# Patient Record
Sex: MALE | Race: WHITE | Employment: FULL TIME | URBAN - METROPOLITAN AREA
[De-identification: names, ages, dates, MRNs, and addresses within clinical notes are randomized per-mention and may not be internally consistent; named-entity substitution may affect disease eponyms.]

---

## 2019-02-02 ENCOUNTER — APPOINTMENT (OUTPATIENT)
Dept: GENERAL RADIOLOGY | Facility: CLINIC | Age: 39
End: 2019-02-02
Payer: COMMERCIAL

## 2019-02-02 ENCOUNTER — NURSE TRIAGE (OUTPATIENT)
Dept: NURSING | Facility: CLINIC | Age: 39
End: 2019-02-02

## 2019-02-02 ENCOUNTER — HOSPITAL ENCOUNTER (EMERGENCY)
Facility: CLINIC | Age: 39
Discharge: HOME | End: 2019-02-02
Attending: EMERGENCY MEDICINE | Admitting: EMERGENCY MEDICINE
Payer: COMMERCIAL

## 2019-02-02 VITALS
WEIGHT: 165 LBS | DIASTOLIC BLOOD PRESSURE: 67 MMHG | TEMPERATURE: 97.9 F | HEIGHT: 69 IN | HEART RATE: 71 BPM | BODY MASS INDEX: 24.44 KG/M2 | SYSTOLIC BLOOD PRESSURE: 128 MMHG | RESPIRATION RATE: 16 BRPM

## 2019-02-02 DIAGNOSIS — T18.9XXA SWALLOWED FOREIGN BODY, INITIAL ENCOUNTER: ICD-10-CM

## 2019-02-02 PROCEDURE — 99283 EMERGENCY DEPT VISIT LOW MDM: CPT

## 2019-02-02 PROCEDURE — 99283 EMERGENCY DEPT VISIT LOW MDM: CPT | Mod: Z6 | Performed by: EMERGENCY MEDICINE

## 2019-02-02 PROCEDURE — 74019 RADEX ABDOMEN 2 VIEWS: CPT

## 2019-02-02 ASSESSMENT — MIFFLIN-ST. JEOR: SCORE: 1668.82

## 2019-02-02 NOTE — ED AVS SNAPSHOT
[facility] Emergency Department  [address] [address]  Phone:  [phone]  Fax:  [phone]                                    Noah Anne   MRN: [id number]    Department:  [facility] Emergency Department   Date of Visit:  2/2/2019           After Visit Summary Signature Page    I have received my discharge instructions, and my questions have been answered. I have discussed any challenges I see with this plan with the nurse or doctor.    ..........................................................................................................................................  Patient/Patient Representative Signature      ..........................................................................................................................................  Patient Representative Print Name and Relationship to Patient    ..................................................               ................................................  Date                                   Time    ..........................................................................................................................................  Reviewed by Signature/Title    ...................................................              ..............................................  Date                                               Time          22EPIC Rev 08/18

## 2019-02-03 NOTE — TELEPHONE ENCOUNTER
Caller swallowed a beer bottle cap 30 minutes ago; can swallow water but has not tried anything solid   Triage protocol reviewed   advised to proceed to ED   understands and will comply   Ruma Siddiqui RN  FNA      Reason for Disposition    Sharp object  (e.g., needle, nail, safety pin, toothpick, bone, bottle cap, pull tab, dental bridge work)     (Exception: tiny chips of glass generally pass without any symptoms)    Additional Information    Negative: Any difficulty breathing (e.g., coughing, wheezing or stridor)    Negative: Sounds like a life-threatening emergency to the triager    Negative: Choked on or inhaled food or foreign body (but did not swallow it)    Protocols used: SWALLOWED FOREIGN BODY-ADULT-

## 2019-02-03 NOTE — ED PROVIDER NOTES
"  History     Chief Complaint   Patient presents with     Swallowed Foreign Body     swallowed metal beer cap accidently.      SARA Anne is a 38 year old male who resents with concerns that he swallowed a bottle cap earlier this evening.  Patient had drinking a beer and had squeezed the bottle cap together.  He then put it in the bottle.  Short time later he wanted to drink water so he filled the bottle with water and drink from it.  He quickly realized that he swallowed the bottle cap.  He tried to induce emesis and stated already threw up was the pizza eaten earlier.  Any chest pain or shortness of breath.  No cough.  He feels hungry but denies significant abdominal pain.  He said no fever or chills.  He has not had a bowel movement since the incident.    Allergies:  No Known Allergies    Problem List:    There are no active problems to display for this patient.       Past Medical History:    No past medical history on file.    Past Surgical History:    No past surgical history on file.    Family History:    No family history on file.    Social History:  Marital Status:  Single [1]  Social History     Tobacco Use     Smoking status: Not on file   Substance Use Topics     Alcohol use: Not on file     Drug use: Not on file        Medications:      No current outpatient medications on file.      Review of Systems all other systems reviewed and are negative.    Physical Exam   BP: 128/67  Pulse: 71  Temp: 97.9  F (36.6  C)  Resp: 16  Height: 175.3 cm (5' 9\")  Weight: 74.8 kg (165 lb)      Physical Exam alert cooperative male in no acute distress.  Orally there is no foreign body noted.  Neck no stridor.  Lungs are clear without adventitious sounds.  Cardiac auscultation is normal.  Abdomen is soft and benign with active bowel sounds.    ED Course        Procedures           Results for orders placed or performed during the hospital encounter of 02/02/19   KUB XR    Narrative    XR ABDOMEN ONE VIEW   2/2/2019 " 9:23 PM     HISTORY: Swallowed metallic foreign body.    COMPARISON: None.      Impression    IMPRESSION: A 2.8 cm radiopaque metallic foreign body is present  projecting within the mid upper abdomen. No evidence of obstruction or  free air. Cholecystectomy clips noted. Soft tissues and osseous  structures are unremarkable.            Critical Care time:  none               Results for orders placed or performed during the hospital encounter of 02/02/19 (from the past 24 hour(s))   KUB XR    Narrative    XR ABDOMEN ONE VIEW   2/2/2019 9:23 PM     HISTORY: Swallowed metallic foreign body.    COMPARISON: None.      Impression    IMPRESSION: A 2.8 cm radiopaque metallic foreign body is present  projecting within the mid upper abdomen. No evidence of obstruction or  free air. Cholecystectomy clips noted. Soft tissues and osseous  structures are unremarkable.       Medications - No data to display    Assessments & Plan (with Medical Decision Making)   Noah Anne is a 38 year old male who resents with concerns that he swallowed a bottle cap earlier this evening.  Patient had drinking a beer and had squeezed the bottle cap together.  He then put it in the bottle.  Short time later he wanted to drink water so he filled the bottle with water and drink from it.  He quickly realized that he swallowed the bottle cap.  He tried to induce emesis and stated already threw up was the pizza eaten earlier.  Any chest pain or shortness of breath.  No cough.  He feels hungry but denies significant abdominal pain.  He said no fever or chills.  He has not had a bowel movement since the incident.  His exam is unremarkable noted above.  A KUB showed 2.8 cm radiopaque metallic foreign horn body in the upper abdomen.  Patient is given information on ingested foreign body.  He can watch his stool for passage or see his doctor in 3-4 days for repeat x-ray to see if there is passage.  Reasons to return to the emergency room are discussed.  I  have reviewed the nursing notes.    I have reviewed the findings, diagnosis, plan and need for follow up with the patient.          Medication List      There are no discharge medications for this visit.         Final diagnoses:   Swallowed foreign body, initial encounter       2/2/2019   [facility] EMERGENCY DEPARTMENT     Arnoldo Leyva MD  02/02/19 0403